# Patient Record
Sex: FEMALE | Race: ASIAN | NOT HISPANIC OR LATINO | Employment: OTHER | URBAN - METROPOLITAN AREA
[De-identification: names, ages, dates, MRNs, and addresses within clinical notes are randomized per-mention and may not be internally consistent; named-entity substitution may affect disease eponyms.]

---

## 2020-02-07 ENCOUNTER — APPOINTMENT (OUTPATIENT)
Dept: CT IMAGING | Facility: CLINIC | Age: 80
DRG: 153 | End: 2020-02-07
Attending: EMERGENCY MEDICINE
Payer: COMMERCIAL

## 2020-02-07 ENCOUNTER — HOSPITAL ENCOUNTER (INPATIENT)
Facility: CLINIC | Age: 80
LOS: 1 days | Discharge: HOME | DRG: 153 | End: 2020-02-08
Attending: EMERGENCY MEDICINE | Admitting: INTERNAL MEDICINE
Payer: COMMERCIAL

## 2020-02-07 DIAGNOSIS — J36 TONSILLAR ABSCESS: ICD-10-CM

## 2020-02-07 PROBLEM — L02.91 ABSCESS: Status: ACTIVE | Noted: 2020-02-07

## 2020-02-07 LAB
ANION GAP SERPL CALCULATED.3IONS-SCNC: 6 MMOL/L (ref 3–14)
BUN SERPL-MCNC: 26 MG/DL (ref 7–30)
CALCIUM SERPL-MCNC: 9.3 MG/DL (ref 8.5–10.1)
CHLORIDE SERPL-SCNC: 104 MMOL/L (ref 94–109)
CO2 SERPL-SCNC: 28 MMOL/L (ref 20–32)
CREAT SERPL-MCNC: 0.99 MG/DL (ref 0.52–1.04)
DEPRECATED S PYO AG THROAT QL EIA: NORMAL
ERYTHROCYTE [DISTWIDTH] IN BLOOD BY AUTOMATED COUNT: 12.6 % (ref 10–15)
GFR SERPL CREATININE-BSD FRML MDRD: 53 ML/MIN/{1.73_M2}
GLUCOSE SERPL-MCNC: 106 MG/DL (ref 70–99)
HCT VFR BLD AUTO: 44.8 % (ref 35–47)
HGB BLD-MCNC: 14.4 G/DL (ref 11.7–15.7)
MCH RBC QN AUTO: 30.4 PG (ref 26.5–33)
MCHC RBC AUTO-ENTMCNC: 32.1 G/DL (ref 31.5–36.5)
MCV RBC AUTO: 95 FL (ref 78–100)
PLATELET # BLD AUTO: 246 10E9/L (ref 150–450)
POTASSIUM SERPL-SCNC: 4.4 MMOL/L (ref 3.4–5.3)
RBC # BLD AUTO: 4.73 10E12/L (ref 3.8–5.2)
SODIUM SERPL-SCNC: 138 MMOL/L (ref 133–144)
SPECIMEN SOURCE: NORMAL
WBC # BLD AUTO: 9.8 10E9/L (ref 4–11)

## 2020-02-07 PROCEDURE — 80048 BASIC METABOLIC PNL TOTAL CA: CPT | Performed by: EMERGENCY MEDICINE

## 2020-02-07 PROCEDURE — 70491 CT SOFT TISSUE NECK W/DYE: CPT

## 2020-02-07 PROCEDURE — 87081 CULTURE SCREEN ONLY: CPT | Performed by: EMERGENCY MEDICINE

## 2020-02-07 PROCEDURE — 99222 1ST HOSP IP/OBS MODERATE 55: CPT | Mod: AI | Performed by: INTERNAL MEDICINE

## 2020-02-07 PROCEDURE — 25000125 ZZHC RX 250: Performed by: EMERGENCY MEDICINE

## 2020-02-07 PROCEDURE — 87880 STREP A ASSAY W/OPTIC: CPT | Performed by: EMERGENCY MEDICINE

## 2020-02-07 PROCEDURE — 25000128 H RX IP 250 OP 636: Performed by: EMERGENCY MEDICINE

## 2020-02-07 PROCEDURE — 96374 THER/PROPH/DIAG INJ IV PUSH: CPT | Mod: 59

## 2020-02-07 PROCEDURE — 12000000 ZZH R&B MED SURG/OB

## 2020-02-07 PROCEDURE — 85027 COMPLETE CBC AUTOMATED: CPT | Performed by: EMERGENCY MEDICINE

## 2020-02-07 PROCEDURE — 25800030 ZZH RX IP 258 OP 636: Performed by: EMERGENCY MEDICINE

## 2020-02-07 PROCEDURE — 96375 TX/PRO/DX INJ NEW DRUG ADDON: CPT

## 2020-02-07 PROCEDURE — 99285 EMERGENCY DEPT VISIT HI MDM: CPT | Mod: 25

## 2020-02-07 RX ORDER — LIDOCAINE 40 MG/G
CREAM TOPICAL
Status: DISCONTINUED | OUTPATIENT
Start: 2020-02-07 | End: 2020-02-08 | Stop reason: HOSPADM

## 2020-02-07 RX ORDER — AMOXICILLIN 250 MG
2 CAPSULE ORAL 2 TIMES DAILY PRN
Status: DISCONTINUED | OUTPATIENT
Start: 2020-02-07 | End: 2020-02-08 | Stop reason: HOSPADM

## 2020-02-07 RX ORDER — ACETAMINOPHEN 650 MG/1
650 SUPPOSITORY RECTAL EVERY 4 HOURS PRN
Status: DISCONTINUED | OUTPATIENT
Start: 2020-02-07 | End: 2020-02-08 | Stop reason: HOSPADM

## 2020-02-07 RX ORDER — ONDANSETRON 4 MG/1
4 TABLET, ORALLY DISINTEGRATING ORAL EVERY 6 HOURS PRN
Status: DISCONTINUED | OUTPATIENT
Start: 2020-02-07 | End: 2020-02-08 | Stop reason: HOSPADM

## 2020-02-07 RX ORDER — HYDROMORPHONE HYDROCHLORIDE 1 MG/ML
0.2 INJECTION, SOLUTION INTRAMUSCULAR; INTRAVENOUS; SUBCUTANEOUS
Status: DISCONTINUED | OUTPATIENT
Start: 2020-02-07 | End: 2020-02-08 | Stop reason: HOSPADM

## 2020-02-07 RX ORDER — AMPICILLIN AND SULBACTAM 2; 1 G/1; G/1
3 INJECTION, POWDER, FOR SOLUTION INTRAMUSCULAR; INTRAVENOUS ONCE
Status: COMPLETED | OUTPATIENT
Start: 2020-02-07 | End: 2020-02-07

## 2020-02-07 RX ORDER — DEXAMETHASONE SODIUM PHOSPHATE 10 MG/ML
10 INJECTION, SOLUTION INTRAMUSCULAR; INTRAVENOUS ONCE
Status: COMPLETED | OUTPATIENT
Start: 2020-02-07 | End: 2020-02-07

## 2020-02-07 RX ORDER — KETOROLAC TROMETHAMINE 15 MG/ML
15 INJECTION, SOLUTION INTRAMUSCULAR; INTRAVENOUS EVERY 6 HOURS PRN
Status: DISCONTINUED | OUTPATIENT
Start: 2020-02-08 | End: 2020-02-08 | Stop reason: HOSPADM

## 2020-02-07 RX ORDER — NALOXONE HYDROCHLORIDE 0.4 MG/ML
.1-.4 INJECTION, SOLUTION INTRAMUSCULAR; INTRAVENOUS; SUBCUTANEOUS
Status: DISCONTINUED | OUTPATIENT
Start: 2020-02-07 | End: 2020-02-08 | Stop reason: HOSPADM

## 2020-02-07 RX ORDER — IOPAMIDOL 755 MG/ML
500 INJECTION, SOLUTION INTRAVASCULAR ONCE
Status: COMPLETED | OUTPATIENT
Start: 2020-02-07 | End: 2020-02-07

## 2020-02-07 RX ORDER — POLYETHYLENE GLYCOL 3350 17 G/17G
17 POWDER, FOR SOLUTION ORAL DAILY PRN
Status: DISCONTINUED | OUTPATIENT
Start: 2020-02-07 | End: 2020-02-08 | Stop reason: HOSPADM

## 2020-02-07 RX ORDER — DEXAMETHASONE SODIUM PHOSPHATE 4 MG/ML
4 INJECTION, SOLUTION INTRA-ARTICULAR; INTRALESIONAL; INTRAMUSCULAR; INTRAVENOUS; SOFT TISSUE EVERY 6 HOURS
Status: DISCONTINUED | OUTPATIENT
Start: 2020-02-08 | End: 2020-02-08 | Stop reason: HOSPADM

## 2020-02-07 RX ORDER — BISACODYL 10 MG
10 SUPPOSITORY, RECTAL RECTAL DAILY PRN
Status: DISCONTINUED | OUTPATIENT
Start: 2020-02-07 | End: 2020-02-08 | Stop reason: HOSPADM

## 2020-02-07 RX ORDER — KETOROLAC TROMETHAMINE 15 MG/ML
15 INJECTION, SOLUTION INTRAMUSCULAR; INTRAVENOUS ONCE
Status: COMPLETED | OUTPATIENT
Start: 2020-02-07 | End: 2020-02-07

## 2020-02-07 RX ORDER — AMOXICILLIN 250 MG
1 CAPSULE ORAL 2 TIMES DAILY PRN
Status: DISCONTINUED | OUTPATIENT
Start: 2020-02-07 | End: 2020-02-08 | Stop reason: HOSPADM

## 2020-02-07 RX ORDER — AMPICILLIN AND SULBACTAM 2; 1 G/1; G/1
3 INJECTION, POWDER, FOR SOLUTION INTRAMUSCULAR; INTRAVENOUS EVERY 6 HOURS
Status: DISCONTINUED | OUTPATIENT
Start: 2020-02-08 | End: 2020-02-08 | Stop reason: HOSPADM

## 2020-02-07 RX ORDER — ONDANSETRON 2 MG/ML
4 INJECTION INTRAMUSCULAR; INTRAVENOUS EVERY 6 HOURS PRN
Status: DISCONTINUED | OUTPATIENT
Start: 2020-02-07 | End: 2020-02-08 | Stop reason: HOSPADM

## 2020-02-07 RX ORDER — HYDRALAZINE HYDROCHLORIDE 20 MG/ML
10 INJECTION INTRAMUSCULAR; INTRAVENOUS EVERY 4 HOURS PRN
Status: DISCONTINUED | OUTPATIENT
Start: 2020-02-07 | End: 2020-02-08 | Stop reason: HOSPADM

## 2020-02-07 RX ORDER — SODIUM CHLORIDE 9 MG/ML
INJECTION, SOLUTION INTRAVENOUS CONTINUOUS
Status: DISCONTINUED | OUTPATIENT
Start: 2020-02-07 | End: 2020-02-08

## 2020-02-07 RX ADMIN — AMPICILLIN SODIUM AND SULBACTAM SODIUM 3 G: 2; 1 INJECTION, POWDER, FOR SOLUTION INTRAMUSCULAR; INTRAVENOUS at 22:30

## 2020-02-07 RX ADMIN — KETOROLAC TROMETHAMINE 15 MG: 15 INJECTION, SOLUTION INTRAMUSCULAR; INTRAVENOUS at 20:25

## 2020-02-07 RX ADMIN — DEXAMETHASONE SODIUM PHOSPHATE 10 MG: 10 INJECTION, SOLUTION INTRAMUSCULAR; INTRAVENOUS at 22:29

## 2020-02-07 RX ADMIN — SODIUM CHLORIDE 1000 ML: 9 INJECTION, SOLUTION INTRAVENOUS at 20:25

## 2020-02-07 RX ADMIN — IOPAMIDOL 80 ML: 755 INJECTION, SOLUTION INTRAVENOUS at 20:41

## 2020-02-07 RX ADMIN — SODIUM CHLORIDE 65 ML: 9 INJECTION, SOLUTION INTRAVENOUS at 20:42

## 2020-02-07 ASSESSMENT — MIFFLIN-ST. JEOR
SCORE: 837.83
SCORE: 832.88

## 2020-02-08 VITALS
HEIGHT: 57 IN | RESPIRATION RATE: 20 BRPM | BODY MASS INDEX: 23.97 KG/M2 | HEART RATE: 65 BPM | OXYGEN SATURATION: 98 % | WEIGHT: 111.1 LBS | DIASTOLIC BLOOD PRESSURE: 57 MMHG | SYSTOLIC BLOOD PRESSURE: 130 MMHG | TEMPERATURE: 97.1 F

## 2020-02-08 LAB
ANION GAP SERPL CALCULATED.3IONS-SCNC: 7 MMOL/L (ref 3–14)
BUN SERPL-MCNC: 27 MG/DL (ref 7–30)
CALCIUM SERPL-MCNC: 8.2 MG/DL (ref 8.5–10.1)
CHLORIDE SERPL-SCNC: 109 MMOL/L (ref 94–109)
CO2 SERPL-SCNC: 24 MMOL/L (ref 20–32)
CREAT SERPL-MCNC: 0.93 MG/DL (ref 0.52–1.04)
ERYTHROCYTE [DISTWIDTH] IN BLOOD BY AUTOMATED COUNT: 12.6 % (ref 10–15)
GFR SERPL CREATININE-BSD FRML MDRD: 57 ML/MIN/{1.73_M2}
GLUCOSE SERPL-MCNC: 158 MG/DL (ref 70–99)
HCT VFR BLD AUTO: 38.1 % (ref 35–47)
HGB BLD-MCNC: 12.2 G/DL (ref 11.7–15.7)
MCH RBC QN AUTO: 29.9 PG (ref 26.5–33)
MCHC RBC AUTO-ENTMCNC: 32 G/DL (ref 31.5–36.5)
MCV RBC AUTO: 93 FL (ref 78–100)
PLATELET # BLD AUTO: 212 10E9/L (ref 150–450)
POTASSIUM SERPL-SCNC: 3.9 MMOL/L (ref 3.4–5.3)
RBC # BLD AUTO: 4.08 10E12/L (ref 3.8–5.2)
SODIUM SERPL-SCNC: 140 MMOL/L (ref 133–144)
WBC # BLD AUTO: 5.5 10E9/L (ref 4–11)

## 2020-02-08 PROCEDURE — 80048 BASIC METABOLIC PNL TOTAL CA: CPT | Performed by: INTERNAL MEDICINE

## 2020-02-08 PROCEDURE — 25000128 H RX IP 250 OP 636: Performed by: INTERNAL MEDICINE

## 2020-02-08 PROCEDURE — 85027 COMPLETE CBC AUTOMATED: CPT | Performed by: INTERNAL MEDICINE

## 2020-02-08 PROCEDURE — 99238 HOSP IP/OBS DSCHRG MGMT 30/<: CPT | Performed by: INTERNAL MEDICINE

## 2020-02-08 PROCEDURE — 36415 COLL VENOUS BLD VENIPUNCTURE: CPT | Performed by: INTERNAL MEDICINE

## 2020-02-08 PROCEDURE — 25800030 ZZH RX IP 258 OP 636: Performed by: INTERNAL MEDICINE

## 2020-02-08 PROCEDURE — 25000125 ZZHC RX 250

## 2020-02-08 RX ORDER — AMOXICILLIN AND CLAVULANATE POTASSIUM 400; 57 MG/5ML; MG/5ML
875 POWDER, FOR SUSPENSION ORAL 2 TIMES DAILY
Qty: 283.4 ML | Refills: 0 | Status: SHIPPED | OUTPATIENT
Start: 2020-02-08 | End: 2020-02-21

## 2020-02-08 RX ORDER — PREDNISOLONE 15 MG/5 ML
45 SOLUTION, ORAL ORAL DAILY
Qty: 75 ML | Refills: 0 | Status: SHIPPED | OUTPATIENT
Start: 2020-02-08 | End: 2020-02-13

## 2020-02-08 RX ORDER — NAPROXEN 500 MG/1
500 TABLET ORAL PRN
COMMUNITY

## 2020-02-08 RX ORDER — LIDOCAINE HYDROCHLORIDE 20 MG/ML
JELLY TOPICAL ONCE
Status: COMPLETED | OUTPATIENT
Start: 2020-02-08 | End: 2020-02-08

## 2020-02-08 RX ORDER — HYDROCHLOROTHIAZIDE 12.5 MG/1
12.5 CAPSULE ORAL DAILY
COMMUNITY

## 2020-02-08 RX ORDER — LIDOCAINE HYDROCHLORIDE 20 MG/ML
JELLY TOPICAL
Status: COMPLETED
Start: 2020-02-08 | End: 2020-02-08

## 2020-02-08 RX ADMIN — SODIUM CHLORIDE: 9 INJECTION, SOLUTION INTRAVENOUS at 02:17

## 2020-02-08 RX ADMIN — DEXAMETHASONE SODIUM PHOSPHATE 4 MG: 4 INJECTION, SOLUTION INTRAMUSCULAR; INTRAVENOUS at 04:19

## 2020-02-08 RX ADMIN — LIDOCAINE HYDROCHLORIDE: 20 JELLY TOPICAL at 10:15

## 2020-02-08 RX ADMIN — AMPICILLIN SODIUM AND SULBACTAM SODIUM 3 G: 2; 1 INJECTION, POWDER, FOR SOLUTION INTRAMUSCULAR; INTRAVENOUS at 04:22

## 2020-02-08 RX ADMIN — DEXAMETHASONE SODIUM PHOSPHATE 4 MG: 4 INJECTION, SOLUTION INTRAMUSCULAR; INTRAVENOUS at 09:48

## 2020-02-08 RX ADMIN — AMPICILLIN SODIUM AND SULBACTAM SODIUM 3 G: 2; 1 INJECTION, POWDER, FOR SOLUTION INTRAMUSCULAR; INTRAVENOUS at 09:51

## 2020-02-08 ASSESSMENT — ACTIVITIES OF DAILY LIVING (ADL)
ADLS_ACUITY_SCORE: 10
ADLS_ACUITY_SCORE: 10
ADLS_ACUITY_SCORE: 12
ADLS_ACUITY_SCORE: 10

## 2020-02-08 NOTE — PLAN OF CARE
Pt to D/C to home with .  Pt provided with d/c instructions, including new medications, when medications were last given, and when to take them again.  Pt also informed to f/u with ENT next week.  Pt verbalized understanding of all d/c and f/u instructions.  All questions were answered at this time.  Copy of paperwork sent with pt.  Medication sent with pt.   to provide transport.  All personal belongings sent with pt.     Pt A/Ox4, moving steady and up ad laila after IV removed. Pain has improved substantially since yesterday.    Dean Diana, RN on 2/8/2020 at 1:10 PM

## 2020-02-08 NOTE — ED NOTES
"[facility]  ED Nurse Handoff Report    Mariluz Gayle is a 80 year old female   ED Chief complaint: Pharyngitis  . ED Diagnosis:   Final diagnoses:   Tonsillar abscess     Allergies:   Allergies   Allergen Reactions     Amlodipine      Other reaction(s): Gastrointestinal  Abdominal pain     Lisinopril Other (See Comments)     Altered taste per patient     Tramadol Nausea and Vomiting     Other reaction(s): Intolerance-Can't Take  Nausea, side effect NOT a true allergy         Code Status: Full Code  Activity level - Baseline/Home:  Independent. Activity Level - Current:   Stand by Assist. Lift room needed: No. Bariatric: No   Needed: No   Isolation: No. Infection: Not Applicable.     Vital Signs:   Vitals:    02/07/20 1850   BP: (!) 158/93   Resp: 18   Temp: 97.9  F (36.6  C)   TempSrc: Oral   SpO2: 94%   Weight: 49.9 kg (110 lb 0.2 oz)   Height: 1.448 m (4' 9\")       Cardiac Rhythm:  ,      Pain level: 0-10 Pain Scale: 10  Patient confused: No. Patient Falls Risk: Yes.   Elimination Status: Has not yet needed to void   Patient Report - Initial Complaint: Sore throat.   Focused Assessment:   Mariluz Gayle is a 80 year old female who presents with pharyngitis.  Patient reports her symptoms first began 8 days prior with a sore throat.  This occurred approximately 2 to 3 hours after eating Chinese food.  She was seen on 1/31/2019, diagnosed with a viral pharyngitis.  Rapid strep test returned negative.  She was seen the following day, and started on a 3-day course of prednisone.  Her symptoms have persisted including a visit to her PCP yesterday, with a diagnosis of pharyngitis as well as acute otitis media for which he was started on Augmentin.  She presents to the ER today with worsening pain.  She notes pain worsened to the right side of her neck.  She is unsure if she swallowed a foreign body.  She is denies associated fevers, or chills.  She notes significant pain with swallowing liquids or " solids.  She has been without vomiting.  Denies any known sick contacts.   Tests Performed:   Soft tissue neck CT w contrast   Final Result   IMPRESSION:   1. Low-attenuation collection is seen within the right palatine tonsil with surrounding rim enhancement and compatible with an abscess. There is an incomplete enhancing margin along its superomedial aspect suggesting that this may be partially draining.      2. There is extensive inflammatory fat stranding and swelling within the right neck and extending along the right carotid sheath. No extratonsillar collections to suggest extratonsillar abscess. No extension of inflammatory changes into the mediastinum.      Findings discussed with Dr. Duval at 21:03 hours.      Abnormal Results:   Labs Ordered and Resulted from Time of ED Arrival Up to the Time of Departure from the ED   BASIC METABOLIC PANEL - Abnormal; Notable for the following components:       Result Value    Glucose 106 (*)     GFR Estimate 53 (*)     All other components within normal limits   CBC WITH PLATELETS   RAPID STREP SCREEN   BETA STREP GROUP A CULTURE      Treatments provided: PIV, Abx, corticosteroids  Family Comments:  Regino was at bedside, updated on pt's plan of care.  OBS brochure/video discussed/provided to patient:  Yes  ED Medications:   Medications   dexamethasone PF (DECADRON) injection 10 mg (has no administration in time range)   ampicillin-sulbactam (UNASYN) 3 g vial to attach to  mL bag (has no administration in time range)   0.9% sodium chloride BOLUS (1,000 mLs Intravenous New Bag 2/7/20 2025)   ketorolac (TORADOL) injection 15 mg (15 mg Intravenous Given 2/7/20 2025)   Saline CT scan flush (65 mLs Intravenous Given 2/7/20 2042)   iopamidol (ISOVUE-370) solution 500 mL (80 mLs Intravenous Given 2/7/20 2041)     Drips infusing:  No  For the majority of the shift, the patient's behavior Green.     Severe Sepsis OR Septic Shock Diagnosis Present: No      ED Nurse  Name/Phone Number: Melissa Carolina RN,   10:12 PM    RECEIVING UNIT ED HANDOFF REVIEW    Above ED Nurse Handoff Report was reviewed: Yes  Reviewed by: Katrin Carrion RN on February 7, 2020 at 10:54 PM

## 2020-02-08 NOTE — ED PROVIDER NOTES
"  History     Chief Complaint:  Pharyngitis     HPI:  Mariluz Gayle is a 80 year old female who presents with pharyngitis.  Patient reports her symptoms first began 8 days prior with a sore throat.  This occurred approximately 2 to 3 hours after eating Chinese food.  She was seen on 1/31/2019, diagnosed with a viral pharyngitis.  Rapid strep test returned negative.  She was seen the following day, and started on a 3-day course of prednisone.  Her symptoms have persisted including a visit to her PCP yesterday, with a diagnosis of pharyngitis as well as acute otitis media for which he was started on Augmentin.  She presents to the ER today with worsening pain.  She notes pain worsened to the right side of her neck.  She is unsure if she swallowed a foreign body.  She is denies associated fevers, or chills.  She notes significant pain with swallowing liquids or solids.  She has been without vomiting.  Denies any known sick contacts.    Allergies:  Amlodipine  Lisinopril  Tramadol     Medications:    Calcium Carbonate  Percocet  Senokot    Past Medical History:    Osteoporosis  Fibrocystic breast disease  Radiculopathy of lumbar region    Past Surgical History:    Rotator cuff repair    Family History:    family history is not on file.    Social History:  Non smoker  Presents with .    Review of Systems:  10 point ROS is negative aside from that mentioned in the HPI      Physical Exam     Patient Vitals for the past 24 hrs:   BP Temp Temp src Heart Rate Resp SpO2 Height Weight   02/07/20 1850 (!) 158/93 97.9  F (36.6  C) Oral 96 18 94 % 1.448 m (4' 9\") 49.9 kg (110 lb 0.2 oz)      General:   Well-nourished   Speaking in full sentences  Eyes:   Conjunctiva without injection or scleral icterus   PERRL   EOM full w/out entrapment or proptosis  ENT:   Moist mucous membranes   No dental tenderness   No fluctuance to palpation along buccal or lingual gingiva   Posterior oropharynx clear with mild erythema, no " exudate   No tonsillar hypertrophy, exudate, asymmetry, nor uvular deviation   No oral lesions   Bilateral TM translucent and gray without air/fluid level or overlying erythema, bony landmarks visualized.   Nares patent   Pinnae normal   No midface swelling, erythema, or asymmetry  Neck:   Full ROM   No stiffness appreciated   No stridor   No soft tissue swelling  Resp:   Lungs CTAB   No crackles, wheezing or audible rubs   Good air movement  CV:    Normal rate, regular rhythm   S1 and S2 present   No murmur, gallop or rub  GI:   BS present   Abdomen soft without distention   Non-tender to light and deep palpation   No guarding or rebound tenderness  Skin:   Warm, dry, well perfused   No rashes or open wounds on exposed skin  MSK:   Moves all extremities   No focal deformities or swelling  Neuro:   Alert   Answers questions appropriately   Moves all extremities equally   Gait stable  Psych:   Normal affect, normal mood      Emergency Department Course   Imaging:  Radiology findings were communicated with the patient who voiced understanding of the findings.  Soft tissue neck CT w contrast   Final Result   IMPRESSION:   1. Low-attenuation collection is seen within the right palatine tonsil with surrounding rim enhancement and compatible with an abscess. There is an incomplete enhancing margin along its superomedial aspect suggesting that this may be partially draining.      2. There is extensive inflammatory fat stranding and swelling within the right neck and extending along the right carotid sheath. No extratonsillar collections to suggest extratonsillar abscess. No extension of inflammatory changes into the mediastinum.      Findings discussed with Dr. Duval at 21:03 hours.           Laboratory:  Laboratory findings were communicated with the patient who voiced understanding of the findings.  Labs Ordered and Resulted from Time of ED Arrival Up to the Time of Departure from the ED   BASIC METABOLIC PANEL -  Abnormal; Notable for the following components:       Result Value    Glucose 106 (*)     GFR Estimate 53 (*)     All other components within normal limits   CBC WITH PLATELETS   RAPID STREP SCREEN   BETA STREP GROUP A CULTURE      Interventions:  Medications   ampicillin-sulbactam (UNASYN) 3 g vial to attach to  mL bag (3 g Intravenous New Bag 2/7/20 2230)   0.9% sodium chloride BOLUS (1,000 mLs Intravenous New Bag 2/7/20 2025)   ketorolac (TORADOL) injection 15 mg (15 mg Intravenous Given 2/7/20 2025)   Saline CT scan flush (65 mLs Intravenous Given 2/7/20 2042)   iopamidol (ISOVUE-370) solution 500 mL (80 mLs Intravenous Given 2/7/20 2041)   dexamethasone PF (DECADRON) injection 10 mg (10 mg Intravenous Given 2/7/20 2229)        Emergency Department Course:  Nursing notes and vitals reviewed.  8:12 PM: I performed an exam of the patient as documented above.      IV was inserted and blood was drawn for laboratory testing, results above.    The patient was sent for CT while in the emergency department, results above.      Patient re-evaluated.    Case discussed with Dr. Martin from ENT.     I discussed the treatment plan with the patient and .  They are in agreement with hospitalization at this time.  Case was discussed with Dr. Perez, who has graciously accepted the patient for admission to medical bed.  Questions were answered prior to transfer of care.      Impression & Plan      Medical Decision Making:  Mariluz Gayle is a 80 year old female who presents to the ER for evaluation of pharyngitis.  Vital signs on presentation reveal elevated BP, though are otherwise unremarkable.  Given duration of symptoms, further advanced imaging was performed including CT soft tissue of the neck.  This demonstrates findings suspicious for a palatine tonsil fluid collection measuring approximately 2.7 x 1.5 x 1.0 cm in size with associated inflammatory changes and fat stranding extending in the right neck and  extending along the right carotid sheath, without evidence of extra tonsillar abscess.  Patient was informed of the incidentally noted atherosclerotic calcification and suggested follow-up with primary care provider regarding this.  Results of the imaging were discussed with Dr. Martin of ENT, who reviewed the imaging and agreed this was not something requiring emergent drainage at this time.  Patient will be started on IV antibiotics and IV steroids and will plan for admission for close monitoring.  ENT will follow along.  Results and impression were discussed with the patient and her .  Patient will be admitted to the hospitalist service under the care of Dr. Perez for further treatment and care.    Diagnosis:    ICD-10-CM    1. Tonsillar abscess J36       2/7/2020   Memo Duval MD Roach, Brian Donald, MD  02/07/20 2242

## 2020-02-08 NOTE — PHARMACY-ADMISSION MEDICATION HISTORY
Admission medication history interview status for this patient is complete. See [facility] admission navigator for allergy information, prior to admission medications and immunization status.     Medication history interview source(s):Patient  Medication history resources (including written lists, pill bottles, clinic record):None  Primary pharmacy:     Changes made to PTA medication list:  Added: all meds  Deleted: -  Changed: -    Actions taken by pharmacist (provider contacted, etc):None     Additional medication history information:None    Medication reconciliation/reorder completed by provider prior to medication history?  No     Do you take OTC medications (eg tylenol, ibuprofen, fish oil, eye/ear drops, etc)? Yes     For patients on insulin therapy: No    Prior to Admission medications    Medication Sig Last Dose Taking? Auth Provider   cholecalciferol (VITAMIN D3) 5000 units (125 mcg) capsule Take 5,000 Units by mouth daily 2/7/2020 at Unknown time Yes Unknown, Entered By History   hydrochlorothiazide (MICROZIDE) 12.5 MG capsule Take 12.5 mg by mouth daily 2/7/2020 at Unknown time Yes Unknown, Entered By History   naproxen (NAPROXEN) 500 MG EC tablet Take 500 mg by mouth as needed Rarely uses Past Month at Unknown time Yes Unknown, Entered By History

## 2020-02-08 NOTE — CONSULTS
[facility] Otolaryngology Consultation    Mariluz Gayle MRN# [id number]   Age: 80 year old YOB: 1940     Date of Admission:  2/7/2020    Reason for consult: Tonsillitis       Requesting physician: Ana       Level of consult: Consult, follow and place orders           Assessment and Plan:   Assessment:   Tonsillitis with tonsil abscess - She has tonsillitis with extension of infection toward the base of tongue and near the hyoid bone.  The area of concern for fluid collection is quite small less than 1 cm.  The right tonsil although has some lucency on the CT scan does not clinically appear to have any drainable abscess, of if drainable it is very small and drainage is not necessary.       Plan:   I do not recommend surgery.  I believe she will get better with antibiotic treatment and some oral prednisone will also help with pain and swelling and speed her recovery. Recommend Augmentin PO liquid for home for 14 days (she had a hard time with the large pills previously).  Recommend prednisone tabs 50mg daily for 5 days, no taper.   She may stay in the hospital until this afternoon.  She may start a diet.  If she is able to take p.o. and has been good pain control I think she can go home this afternoon.  I recommend a follow-up visit in ENT clinic Thursday or Friday this week.  The patient was given my name and phone number and instructed to call the clinic Monday to make an appointment.             Chief Complaint:   Tonsillitis     History is obtained from the patient         History of Present Illness:   Mariluz Gayle is a 80 year old female who presents with pharyngitis.  Patient reports her symptoms first began 8 days prior with a sore throat.  This occurred approximately 2 to 3 hours after eating Chinese food.  She was seen on 1/31/2019, diagnosed with a viral pharyngitis.  Rapid strep test returned negative.  She was seen the following day, and started on a 3-day course of prednisone.  Her  symptoms have persisted including a visit to her PCP yesterday, with a diagnosis of pharyngitis as well as acute otitis media for which he was started on Augmentin.  She presents to the ER today with worsening pain.  She notes pain worsened to the right side of her neck.  She is unsure if she swallowed a foreign body.  She is denies associated fevers, or chills.  She notes significant pain with swallowing liquids or solids.  She has been without vomiting.  Denies any known sick contacts.          Past Medical History:   I have reviewed this patient's past medical history          Past Surgical History:   I have reviewed this patient's past surgical history          Social History:   I have reviewed this patient's social history          Family History:   This patient has no significant family history          Immunizations:   UTD          Allergies:     Allergies   Allergen Reactions     Amlodipine      Other reaction(s): Gastrointestinal  Abdominal pain     Lisinopril Other (See Comments)     Altered taste per patient     Tramadol Nausea and Vomiting     Other reaction(s): Intolerance-Can't Take  Nausea, side effect NOT a true allergy               Medications:     Current Facility-Administered Medications   Medication     lidocaine (XYLOCAINE) 2 % external gel     acetaminophen (TYLENOL) Suppository 650 mg     ampicillin-sulbactam (UNASYN) 3 g vial to attach to  mL bag     bisacodyl (DULCOLAX) Suppository 10 mg     dexamethasone (DECADRON) injection 4 mg     hydrALAZINE (APRESOLINE) injection 10 mg     HYDROmorphone (PF) (DILAUDID) injection 0.2 mg     ketorolac (TORADOL) injection 15 mg     lidocaine (LMX4) cream     lidocaine (XYLOCAINE) 2 % external gel     lidocaine 1 % 0.1-1 mL     melatonin tablet 1 mg     naloxone (NARCAN) injection 0.1-0.4 mg     ondansetron (ZOFRAN-ODT) ODT tab 4 mg    Or     ondansetron (ZOFRAN) injection 4 mg     polyethylene glycol (MIRALAX/GLYCOLAX) Packet 17 g      "senna-docusate (SENOKOT-S/PERICOLACE) 8.6-50 MG per tablet 1 tablet    Or     senna-docusate (SENOKOT-S/PERICOLACE) 8.6-50 MG per tablet 2 tablet     sodium chloride (PF) 0.9% PF flush 3 mL     sodium chloride (PF) 0.9% PF flush 3 mL     sodium chloride 0.9% infusion             Review of Systems:   CONSTITUTIONAL: NEGATIVE for fever, chills, change in weight  RESP: NEGATIVE for significant cough or SOB  CV: NEGATIVE for chest pain, palpitations or peripheral edema          Physical Exam:   /57 (BP Location: Right arm)   Pulse 65   Temp 97.1  F (36.2  C) (Oral)   Resp 20   Ht 1.448 m (4' 9\")   Wt 50.4 kg (111 lb 1.6 oz)   SpO2 98%   BMI 24.04 kg/m    PHYSICAL EXAMINATION    /57 (BP Location: Right arm)   Pulse 65   Temp 97.1  F (36.2  C) (Oral)   Resp 20   Ht 1.448 m (4' 9\")   Wt 50.4 kg (111 lb 1.6 oz)   SpO2 98%   BMI 24.04 kg/m    General appearance: Well developed, well nourished and groomed. No apparent acute or chronic distress.   Ability to communicate: normal. No hoarseness or stridor  HEAD, FACE, SALIVARY GLANDS AND TMJ:   Inspection of Head and Face: Normal contour and symmetry. No masses, lesions, or significant scars observed.   Palpation/Percussion of the Face: No tenderness, deformity or instability.   Palpation of Parotid and Submaxillary glands: Normal.   Facial Mobility: Normal.   EYES: Ocular Motility: gaze appears conjugate in all positions; no evident nystagmus.   EAR, NOSE, MOUTH AND THROAT:   Pinnas and External Nose: Normal.   Hearing: Conversational speech rarely or never misunderstands words.   Nasal Interior:   Septum - Midline.   Turbinates and middle meatus - Normal.   Rhinorrhea - None.   Vestibular skin - Normal bilaterally.   Mucosa - Normal.   Lips, Teeth and Gums: Normal. Adequate salivary pool.  Dentition in good repair  Oral Cavity and Oropharynx: mild swelling right tonsil, minimal erythema.  No trismus.  No palatal bulging.  No tongue swelling.    NECK " AND THYROID:   Neck: normal symmetry; trachea midline; normal laryngeal crepitation; no adenopathy; no neck masses; no skin lesions; no scars.   Thyroid: normal size; no masses or tenderness.   RESPIRATORY: Chest Wall expands normally and no deformities noted. Respiratory Effort normal.   CARDIOVASCULAR:   Symmetric carotid pulses regular rate.  Peripheral Vascular System: normal pulses with no peripheral vascular swelling or varicosities, tenderness or edema. Skin warm and dry.   LYMPH NODES: Neck Nodes: normal, no adenopathy.   NEUROLOGIC:   Level of orientation: normal to time, place, person and situation.   Cranial nerves: Cranial nerves II-XII grossly intact and symmetrical.   PSYCHIATRIC:   Level of consciousness: awake and alert.   Judgment and insight: normal.   Mood and affect: normal and appropriate to the situation.     FLEXIBLE FIBEROPTIC LARYNGOSCOPY.  Nasal cavities: patent and normal.    Nasopharynx examination: Normal   Base of tongue, Pharyngeal walls, Vallecula and Pyriform Sinuses: right base of tongue at junction of the tonsil has small amount of swelling, small amount of cloudy drainage near this area which could be some debris in the right tonsil, or possibly spontaneous drainage from t todayhe tonsil  Larynx: Normal, no edema, vocal cords symmetrically mobile  Subglottis and trachea: Normal           Data:   All laboratory data reviewed    CT neck reviewed and not convincing abscess which would be drainable.  The lucency of the right tonsil is possibly debris in a crypt. The lucency near the hyoid is small.        Memo Martin MD

## 2020-02-08 NOTE — PLAN OF CARE
Patient admitted with throat pain, peritonsillar abscess. Patient states area is now a little sore but feeling much better and able to swallow. Up with SBA. Receiving Unasyn and Decadron.

## 2020-02-08 NOTE — H&P
[facility]    History and Physical - Hospitalist Service       Date of Admission:  2/7/2020    Assessment & Plan   Mariluz Gayle is a 80 year old female admitted on 2/7/2020. She presented to the hospital with several days of sore throat.  She was found to have a peritonsillar abscess.    1.  Peritonsillar abscess.  Continue IV Unasyn.  IV Decadron 4 mg every 6 hours initially.  ENT consult.  N.p.o. after midnight.  Clear liquids until midnight.  Continuous IV fluids.  Pain medications as needed.    2.  Hypertension.  Have IV hydralazine available if needed.     Diet: Clear liquid, n.p.o. after midnight  DVT Prophylaxis: Pneumatic Compression Devices  Royal Catheter: not present  Code Status: Full code    Disposition Plan   Expected discharge: 2 - 3 days, recommended to prior living arrangement    Elvis Perez, DO  [facility]    ______________________________________________________________________    Chief Complaint   Sore throat    History is obtained from the patient    History of Present Illness   Mariluz Galye is a 80 year old female with a past medical history significant for hypertension, osteoporosis, and degenerative disc disease of her back who developed a sore throat approximately 8 days ago.  She has been having difficulty eating and drinking because of sore throat.  Sore throat seems to have gotten worse since it started 8 days ago.  She has felt chills at times.  She had actually been into the clinic a couple of times for evaluation.  She actually was started on Augmentin yesterday for possible pharyngitis.  She continued to have severe throat pain today.  Not even able to drink water earlier today.  Nothing at home seem to be helping symptoms.  She presented to emergency room for further evaluation.  She is feeling quite a bit better after pain medications given in the emergency room.  Is able to drink some liquids now.  She has not had any nausea or vomiting with this.   No other acute complaints.    Review of Systems    The 10 point Review of Systems is negative other than noted in the HPI     Past Medical History    I have reviewed this patient's medical history and updated it with pertinent information if needed.     Hypertension  Osteoporosis  DJD    Past Surgical History   I have reviewed this patient's surgical history and updated it with pertinent information if needed.    Social History   I have reviewed this patient's social history and updated it with pertinent information if needed.    She does not smoke.  Does not drink alcohol.    Family History   I have reviewed this patient's family history and updated it with pertinent information if needed.   No known cardiac disease in the immediate family.    Prior to Admission Medications   None     Allergies   Allergies   Allergen Reactions     Amlodipine      Other reaction(s): Gastrointestinal  Abdominal pain     Lisinopril Other (See Comments)     Altered taste per patient     Tramadol Nausea and Vomiting     Other reaction(s): Intolerance-Can't Take  Nausea, side effect NOT a true allergy         Physical Exam   Vital Signs: Temp: 97.9  F (36.6  C) Temp src: Oral BP: (!) 158/93   Heart Rate: 96 Resp: 18 SpO2: 94 % O2 Device: None (Room air)    Weight: 110 lbs .15 oz    Gen:  NAD, A&Ox3.  Eyes:  PERRL, sclera anicteric.  OP:  MMM, no lesions.  Neck:  Supple.  CV:  Regular, no murmurs.  Lung:  CTA b/l, normal effort.  Ab:  +BS, soft.  Skin:  Warm, dry to touch.  No rash.  Ext:  No pitting edema LE b/l.    Data   Data reviewed today: I reviewed all medications, new labs and imaging results over the last 24 hours.    Recent Labs   Lab 02/07/20 1958   WBC 9.8   HGB 14.4   MCV 95         POTASSIUM 4.4   CHLORIDE 104   CO2 28   BUN 26   CR 0.99   ANIONGAP 6   PEDRO 9.3   *

## 2020-02-08 NOTE — ED TRIAGE NOTES
Pt presents for evaluation of a sore throat since last Thursday. Pt was seen by an MD yesterday, was given Augmentin, but hasn't improved. Pt also c/o post nasal drip. Pt has difficulty eating due to swallowing. Recently had an xray to check for dental abscess, which was negative.

## 2020-02-08 NOTE — PROGRESS NOTES
SPIRITUAL HEALTH SERVICES  [facility]  ON-CALL VISIT     DATA:    SH request from Pt.  Pt has a tonsil abscess.  Listed [facility] background      INTERVENTION:    Introduced SHS; reflective listening/conversation/ offered prayer       ASSESSMENT:    Pt talked about the long journey of pain and suffering with this abscess. Expressed deep appreciation for [facility] doctors who were finally able to bring some relief, and she was able to enjoy eating solid food for the first time in 8 days.  Spouse was in the room giving support, while SH was present, Doctor came in and gave Pt discharge.  Pt expressed feeling very positive and encouraged.  Declined prayer       PLAN:    No follow up needed  Johnny Green  Chaplain Resident

## 2020-02-09 LAB
BACTERIA SPEC CULT: NORMAL
Lab: NORMAL
SPECIMEN SOURCE: NORMAL

## 2020-02-09 NOTE — DISCHARGE SUMMARY
Discharge Summary  [facility]    Mariluz Gayle MRN# [id number]   YOB: 1940 Age: 80 year old     Date of Admission:  2/7/2020  Date of Discharge:  2/8/2020  2:19 PM  Admitting Physician:  Elvis Perez DO  Discharge Physician: Gustavo Devlin MD  Discharging Service: Hospitalist     Primary Provider: Linda Silva  Primary Care Physician Phone Number: [phone]         Discharge Diagnoses/Problem Oriented Hospital Course (Providers):      Mariluz Gayle was admitted on 2/7/2020 by Elvis Perez DO and I would refer you to their history and physical.  The following problems were addressed during her hospitalization:     I saw and evaluated the patient     Discharge diagnoses:  1. Acute tonsillitis with right tonsillar abscess  2. History of hypertension      Hospital course:    Patient is a pleasant 80-year-old female with past medical history significant for hypertension, osteoporosis, and degenerative disc disease who presents here with progressive sore throat, posterior oropharyngeal pain, fevers, and chills that started 8 days prior to admission.  She also reported significant odynophagia and has had trouble eating and drinking because of her sore throat.  She was actually seen in her clinic a couple times prior to this visitation and was restarted on Augmentin the day prior to admission.  Symptoms had gotten to the point where even drinking hurts and had significant difficulty swallowing.  She presented here to the ED and a CT soft tissue the neck did demonstrate a fluid collection in the right palatine tonsil consistent with an abscess.  Patient was given IV Unasyn along with IV Decadron.  ENT was consulted.  On their consultation, they felt that the abscess in of itself was too small for drainage and only recommended a short course of steroids as well as p.o. Augmentin for 14 days.  On the day of discharge, patient reports that she feels 90% better and was able to  eat a regular diet with less difficulties in terms of swallowing or pain.  She will be discharged with outpatient ENT follow-up within a week.            Pending Results:        Unresulted Labs Ordered in the Past 30 Days of this Admission     Date and Time Order Name Status Description    2/7/2020 1959 Beta strep group A culture Preliminary                Discharge Instructions and Follow-Up:      Follow-up Appointments     Follow-up and recommended labs and tests       Call to make appointment with Dr. Martin on Monday for appt date Thursday   or Friday.                  Discharge Disposition:        Discharged to home          Discharge Medications:        Discharge Medication List as of 2/8/2020 12:54 PM      START taking these medications    Details   amoxicillin-clavulanate (AUGMENTIN) 400-57 MG/5ML suspension Take 10.9 mLs (875 mg) by mouth 2 times daily for 13 days, Disp-283.4 mL, R-0, E-Prescribe      prednisoLONE (ORAPRED/PRELONE) 15 MG/5ML solution Take 15 mLs (45 mg) by mouth daily for 5 days, Disp-75 mL, R-0, E-Prescribe         CONTINUE these medications which have NOT CHANGED    Details   cholecalciferol (VITAMIN D3) 5000 units (125 mcg) capsule Take 5,000 Units by mouth daily, Historical      hydrochlorothiazide (MICROZIDE) 12.5 MG capsule Take 12.5 mg by mouth daily, Historical      naproxen (NAPROXEN) 500 MG EC tablet Take 500 mg by mouth as needed Rarely uses, Historical                  Allergies:         Allergies   Allergen Reactions     Amlodipine      Other reaction(s): Gastrointestinal  Abdominal pain     Lisinopril Other (See Comments)     Altered taste per patient     Tramadol Nausea and Vomiting     Other reaction(s): Intolerance-Can't Take  Nausea, side effect NOT a true allergy              Consultations This Hospital Stay:        Consultation during this admission received from otolaryngology           Discharge Orders for Skilled Facility (from Discharge Orders):        After Care  Instructions     Activity      Your activity upon discharge: activity as tolerated         Diet      Regular diet                    Rehab orders for Skilled Facility (from Discharge Orders):                Image Results From This Hospital Stay (For Non-EPIC Providers):        Results for orders placed or performed during the hospital encounter of 02/07/20   Soft tissue neck CT w contrast    Narrative    [facility]  CT SOFT TISSUE NECK W CONTRAST  2/7/2020 8:39 PM     INDICATION: Sore throat since last Thursday. Started on Augmentin yesterday without improvement. Difficulty swallowing.  TECHNIQUE: Contiguous serial axial images were obtained through the neck after the administration of IV contrast. Multiplanar reformats were generated. Dose reduction techniques were used.  CONTRAST: 80 mL of Isovue 370 intravenous contrast.  COMPARISON: None.    FINDINGS: There is a vertically oriented band of low-attenuation change within the central aspect of the right palatine tonsil measuring 2.7 cm in craniocaudal dimension and measuring up to 1.5 cm in width and approximately 1 cm in AP dimension. There is   rim enhancement within the surrounding tonsillar tissue and although this is favored to reflect an abscess, superiorly the area of low-attenuation is not bounded by enhancing tissue suggesting that at least superiorly this may reflect material within a   tonsillar crypt.    There is diffuse stranding of the soft tissues deep to the right palatine tonsil and surrounding the right carotid sheath compatible with infectious/inflammatory change. No extratonsillar collections are seen. Right-sided carotid and jugular veins remain   widely patent.    Inflammatory changes on the right extend caudally to the level of the glottis. Although there is prevertebral involvement superiorly, no intrathoracic extension of inflammatory changes seen.    Oral cavity, mucosal space, and airway are otherwise unremarkable. Small  cervical chain lymph nodes are seen that do not meet size criteria for pathologic enlargement. No clearly suppurative or necrotic lymph nodes.    Heavy atherosclerotic calcification is seen within the visualized aortic arch and to a lesser extent proximal great vessels. Visualized lung apices reveal moderate centrilobular emphysema. Visualized osseous structures are without suspicious lytic or   blastic foci. Prior cataract surgeries. Visualized orbits otherwise unremarkable. Mild ethmoid and right sphenoid sinus mucosal thickening. Visualized paranasal sinuses, middle ear cavities, and mastoid air cells are otherwise clear. No air-fluid levels.      Impression    IMPRESSION:  1. Low-attenuation collection is seen within the right palatine tonsil with surrounding rim enhancement and compatible with an abscess. There is an incomplete enhancing margin along its superomedial aspect suggesting that this may be partially draining.    2. There is extensive inflammatory fat stranding and swelling within the right neck and extending along the right carotid sheath. No extratonsillar collections to suggest extratonsillar abscess. No extension of inflammatory changes into the mediastinum.    Findings discussed with Dr. Duval at 21:03 hours.

## 2023-01-08 ENCOUNTER — HOSPITAL ENCOUNTER (EMERGENCY)
Facility: CLINIC | Age: 83
Discharge: HOME | End: 2023-01-08
Attending: EMERGENCY MEDICINE | Admitting: EMERGENCY MEDICINE
Payer: COMMERCIAL

## 2023-01-08 ENCOUNTER — APPOINTMENT (OUTPATIENT)
Dept: MRI IMAGING | Facility: CLINIC | Age: 83
End: 2023-01-08
Attending: EMERGENCY MEDICINE
Payer: COMMERCIAL

## 2023-01-08 VITALS
OXYGEN SATURATION: 100 % | RESPIRATION RATE: 18 BRPM | SYSTOLIC BLOOD PRESSURE: 148 MMHG | DIASTOLIC BLOOD PRESSURE: 60 MMHG | HEART RATE: 69 BPM | TEMPERATURE: 97.3 F

## 2023-01-08 DIAGNOSIS — M54.42 ACUTE LEFT-SIDED LOW BACK PAIN WITH LEFT-SIDED SCIATICA: ICD-10-CM

## 2023-01-08 DIAGNOSIS — M54.17 LUMBOSACRAL RADICULOPATHY AT L4: ICD-10-CM

## 2023-01-08 PROCEDURE — 99285 EMERGENCY DEPT VISIT HI MDM: CPT | Mod: 25

## 2023-01-08 PROCEDURE — 72158 MRI LUMBAR SPINE W/O & W/DYE: CPT | Mod: MF

## 2023-01-08 PROCEDURE — 255N000002 HC RX 255 OP 636: Performed by: EMERGENCY MEDICINE

## 2023-01-08 PROCEDURE — A9585 GADOBUTROL INJECTION: HCPCS | Performed by: EMERGENCY MEDICINE

## 2023-01-08 RX ORDER — GADOBUTROL 604.72 MG/ML
5 INJECTION INTRAVENOUS ONCE
Status: COMPLETED | OUTPATIENT
Start: 2023-01-08 | End: 2023-01-08

## 2023-01-08 RX ADMIN — GADOBUTROL 5 ML: 604.72 INJECTION INTRAVENOUS at 14:56

## 2023-01-08 ASSESSMENT — ACTIVITIES OF DAILY LIVING (ADL)
ADLS_ACUITY_SCORE: 33
ADLS_ACUITY_SCORE: 33
ADLS_ACUITY_SCORE: 35

## 2023-01-08 ASSESSMENT — ENCOUNTER SYMPTOMS: NUMBNESS: 0

## 2023-01-08 NOTE — ED PROVIDER NOTES
History     Chief Complaint:  Back Pain and Leg Pain       The history is provided by the patient.      Mariluz Gayle is a 82 year old female who presents with back pain. Patient reports she was diagnosed with a compression fracture at L4 in November, which she has begun getting injections for. After her recent injection, she began experiencing lower left back pain, which shoots down her left leg. Denies any urinary/stool changes and numbness.       Independent Historian: yes     Review of External Notes: Chart review     ROS:  Review of Systems   Neurological: Negative for numbness.   All other systems reviewed and are negative.    Allergies:  Amlodipine  Lisinopril  Tramadol     Medications:    Percocet  Hydrocodone     Past Medical History:    Hypertension  Aortic calcification  Osteoporosis  Colon polyp   Bronchitis   Cataract    Past Surgical History:    Eye surgery  Shoulder surgery  Back surgery  Cataract removal (x2)     Family History:    Lung disease    Social History:  PCP: Linda Simon     Physical Exam   Patient Vitals for the past 24 hrs:   BP Temp Temp src Pulse Resp SpO2   01/08/23 1011 (!) 150/72 97.3  F (36.3  C) Temporal 67 19 98 %        Physical Exam  Constitutional: Alert, attentive, GCS 15   Eyes: EOM are normal, anicteric, conjugate gaze  CV: distal extremities warm, well perfused  Chest: Non-labored breathing on RA  GI:  non tender. No distension. No guarding or rebound.    MSK: Diffuse tenderness over left posterior lumbar paraspinal musculature extending into her buttocks, plantarflexion, full in bilateral lower extremities, distal sensation intact.  Neurological: Alert, attentive, moving all extremities equally.   Skin: Skin is warm and dry.        Emergency Department Course     Imaging:  MR Lumbar Spine w/o & w Contrast    (Results Pending)      Report per radiology    Laboratory:  Labs Ordered and Resulted from Time of ED Arrival to Time of ED Departure - No data to display      Emergency Department Course & Assessments:    Interventions:  Medications - No data to display     Independent Interpretation (X-rays, CTs, rhythm strip):  N/A    Consultations/Discussion of Management or Tests:  ED Course as of 23 1142   Sun 2023   1141 I obtained history and examined the patient.      Social Determinants of Health affecting care:  N/A    Disposition:  Care of the patient was transferred to my colleague Dr. Langley pending MRI.     Impression & Plan      CMS Diagnoses: None    Medical Decision Makin-year-old woman past medical history significant for known T12 spinal compression fracture, prior L4-L5 fusion with mild compression of the L4 superior endplate on MRI 2022 for which she underwent cortisone injection 2 weeks prior at outside hospital presenting now for increasing left radicular pain.  She denies fever, she is not on blood thinners but does take naproxen.  On exam she has no overt red flag symptoms with no leg weakness, no saddle anesthesia however given her recent injection and now worsening symptoms, concern is for less likely epidural abscess, potentially epidural hematoma or herniated disc.  Patient did not need additional pain control here in the emergency department, pending MRI, case signed out to Dr. Langley.    Diagnosis:    ICD-10-CM    1. Lumbosacral radiculopathy at L4  M54.17       2. Acute left-sided low back pain with left-sided sciatica  M54.42            Andrew Stacy MD  [employer]  1:14 PM 23     Scribe Disclosure:  I, JOSE JUAN AMEZQUITA, am serving as a scribe at 11:24 AM on 2023 to document services personally performed by Andrew Stacy MD based on my observations and the provider's statements to me.     2023   Andrew Stacy MD Dunbar, John Forrest, MD  [id number]

## 2023-01-08 NOTE — ED TRIAGE NOTES
Hx compression fracture to lower back discovered 12/1.  Back injection in the past two weeks, unsure what was injected. Since then having L hip pain radiating down to L ankle. VSS on RA. Pt reports is painful and tingling.

## 2023-11-22 ENCOUNTER — HOSPITAL ENCOUNTER (EMERGENCY)
Facility: CLINIC | Age: 83
Discharge: HOME | End: 2023-11-23
Attending: EMERGENCY MEDICINE | Admitting: EMERGENCY MEDICINE
Payer: COMMERCIAL

## 2023-11-22 DIAGNOSIS — N30.00 ACUTE CYSTITIS WITHOUT HEMATURIA: ICD-10-CM

## 2023-11-22 DIAGNOSIS — R20.2 LEFT LEG PARESTHESIAS: ICD-10-CM

## 2023-11-22 DIAGNOSIS — Z98.1 S/P LUMBAR FUSION: ICD-10-CM

## 2023-11-22 PROCEDURE — 99285 EMERGENCY DEPT VISIT HI MDM: CPT | Mod: 25

## 2023-11-23 ENCOUNTER — APPOINTMENT (OUTPATIENT)
Dept: MRI IMAGING | Facility: CLINIC | Age: 83
End: 2023-11-23
Attending: EMERGENCY MEDICINE
Payer: COMMERCIAL

## 2023-11-23 VITALS
HEART RATE: 68 BPM | DIASTOLIC BLOOD PRESSURE: 73 MMHG | TEMPERATURE: 97.4 F | SYSTOLIC BLOOD PRESSURE: 168 MMHG | OXYGEN SATURATION: 98 % | RESPIRATION RATE: 16 BRPM

## 2023-11-23 LAB
ALBUMIN UR-MCNC: NEGATIVE MG/DL
ANION GAP SERPL CALCULATED.3IONS-SCNC: 11 MMOL/L (ref 7–15)
APPEARANCE UR: CLEAR
BACTERIA #/AREA URNS HPF: ABNORMAL /HPF
BASOPHILS # BLD AUTO: 0.1 10E3/UL (ref 0–0.2)
BASOPHILS NFR BLD AUTO: 1 %
BILIRUB UR QL STRIP: NEGATIVE
BUN SERPL-MCNC: 30.8 MG/DL (ref 8–23)
CALCIUM SERPL-MCNC: 8.3 MG/DL (ref 8.8–10.2)
CHLORIDE SERPL-SCNC: 100 MMOL/L (ref 98–107)
COLOR UR AUTO: ABNORMAL
CREAT SERPL-MCNC: 1.08 MG/DL (ref 0.51–0.95)
DEPRECATED HCO3 PLAS-SCNC: 26 MMOL/L (ref 22–29)
EGFRCR SERPLBLD CKD-EPI 2021: 50 ML/MIN/1.73M2
EOSINOPHIL # BLD AUTO: 0.7 10E3/UL (ref 0–0.7)
EOSINOPHIL NFR BLD AUTO: 10 %
ERYTHROCYTE [DISTWIDTH] IN BLOOD BY AUTOMATED COUNT: 13.1 % (ref 10–15)
GLUCOSE SERPL-MCNC: 102 MG/DL (ref 70–99)
GLUCOSE UR STRIP-MCNC: NEGATIVE MG/DL
HCT VFR BLD AUTO: 32.1 % (ref 35–47)
HGB BLD-MCNC: 10.1 G/DL (ref 11.7–15.7)
HGB UR QL STRIP: NEGATIVE
IMM GRANULOCYTES # BLD: 0.1 10E3/UL
IMM GRANULOCYTES NFR BLD: 1 %
KETONES UR STRIP-MCNC: NEGATIVE MG/DL
LEUKOCYTE ESTERASE UR QL STRIP: NEGATIVE
LYMPHOCYTES # BLD AUTO: 1.6 10E3/UL (ref 0.8–5.3)
LYMPHOCYTES NFR BLD AUTO: 21 %
MCH RBC QN AUTO: 31.2 PG (ref 26.5–33)
MCHC RBC AUTO-ENTMCNC: 31.5 G/DL (ref 31.5–36.5)
MCV RBC AUTO: 99 FL (ref 78–100)
MONOCYTES # BLD AUTO: 0.6 10E3/UL (ref 0–1.3)
MONOCYTES NFR BLD AUTO: 7 %
NEUTROPHILS # BLD AUTO: 4.5 10E3/UL (ref 1.6–8.3)
NEUTROPHILS NFR BLD AUTO: 60 %
NITRATE UR QL: POSITIVE
NRBC # BLD AUTO: 0 10E3/UL
NRBC BLD AUTO-RTO: 0 /100
PH UR STRIP: 6.5 [PH] (ref 5–7)
PLATELET # BLD AUTO: 300 10E3/UL (ref 150–450)
POTASSIUM SERPL-SCNC: 4.4 MMOL/L (ref 3.4–5.3)
RBC # BLD AUTO: 3.24 10E6/UL (ref 3.8–5.2)
RBC URINE: <1 /HPF
SODIUM SERPL-SCNC: 137 MMOL/L (ref 135–145)
SP GR UR STRIP: 1.01 (ref 1–1.03)
UROBILINOGEN UR STRIP-MCNC: NORMAL MG/DL
WBC # BLD AUTO: 7.5 10E3/UL (ref 4–11)
WBC URINE: 4 /HPF

## 2023-11-23 PROCEDURE — 87086 URINE CULTURE/COLONY COUNT: CPT | Performed by: EMERGENCY MEDICINE

## 2023-11-23 PROCEDURE — 84520 ASSAY OF UREA NITROGEN: CPT | Performed by: EMERGENCY MEDICINE

## 2023-11-23 PROCEDURE — 81001 URINALYSIS AUTO W/SCOPE: CPT | Performed by: EMERGENCY MEDICINE

## 2023-11-23 PROCEDURE — 72158 MRI LUMBAR SPINE W/O & W/DYE: CPT

## 2023-11-23 PROCEDURE — 85025 COMPLETE CBC W/AUTO DIFF WBC: CPT | Performed by: EMERGENCY MEDICINE

## 2023-11-23 PROCEDURE — 36415 COLL VENOUS BLD VENIPUNCTURE: CPT | Performed by: EMERGENCY MEDICINE

## 2023-11-23 PROCEDURE — 255N000002 HC RX 255 OP 636: Performed by: EMERGENCY MEDICINE

## 2023-11-23 PROCEDURE — A9585 GADOBUTROL INJECTION: HCPCS | Performed by: EMERGENCY MEDICINE

## 2023-11-23 PROCEDURE — 250N000011 HC RX IP 250 OP 636: Mod: JZ | Performed by: EMERGENCY MEDICINE

## 2023-11-23 RX ORDER — HYDROMORPHONE HYDROCHLORIDE 2 MG/1
2 TABLET ORAL EVERY 6 HOURS PRN
Qty: 8 TABLET | Refills: 0 | Status: SHIPPED | OUTPATIENT
Start: 2023-11-23 | End: 2023-11-26

## 2023-11-23 RX ORDER — METHOCARBAMOL 500 MG/1
500 TABLET, FILM COATED ORAL 4 TIMES DAILY PRN
Qty: 30 TABLET | Refills: 0 | Status: SHIPPED | OUTPATIENT
Start: 2023-11-23

## 2023-11-23 RX ORDER — CEFTRIAXONE 1 G/1
1 INJECTION, POWDER, FOR SOLUTION INTRAMUSCULAR; INTRAVENOUS ONCE
Qty: 10 ML | Refills: 0 | Status: COMPLETED | OUTPATIENT
Start: 2023-11-23 | End: 2023-11-23

## 2023-11-23 RX ORDER — CEPHALEXIN 500 MG/1
500 CAPSULE ORAL 2 TIMES DAILY
Qty: 14 CAPSULE | Refills: 0 | Status: SHIPPED | OUTPATIENT
Start: 2023-11-23 | End: 2023-11-30

## 2023-11-23 RX ORDER — GADOBUTROL 604.72 MG/ML
5 INJECTION INTRAVENOUS ONCE
Status: COMPLETED | OUTPATIENT
Start: 2023-11-23 | End: 2023-11-23

## 2023-11-23 RX ADMIN — GADOBUTROL 5 ML: 604.72 INJECTION INTRAVENOUS at 00:28

## 2023-11-23 RX ADMIN — CEFTRIAXONE 1 G: 1 INJECTION, POWDER, FOR SOLUTION INTRAMUSCULAR; INTRAVENOUS at 01:10

## 2023-11-23 ASSESSMENT — ACTIVITIES OF DAILY LIVING (ADL): ADLS_ACUITY_SCORE: 35

## 2023-11-23 NOTE — ED TRIAGE NOTES
Numbness and tingling in LLE that started 3-4 days ago. Post op 2 weeks fusion of L3-L4. Patient also complains of rash surrounding her surgical site.

## 2023-11-23 NOTE — ED NOTES
Pt concurs with triage note - she had her surgery 2 weeks ago at [facility]. She  adds 12 years ago she had a similar surgery, just different location, and she didn't have this issue. Pt states she was taking Oxy for her pain, but rash was noted, so she was switched to Dilaudid - it helps her pain but she notes a rash - worse on her back, but noted on chest and right arm. Daughter states they haven't put any salve/creams on it. Pt has a strong foot press and is able to walk, but c/o numbness/tingling on the entire left lower extremity. Pt is alert, orient and appropriate. Her skin is warm and dry. Her daughter is by her side.

## 2023-11-23 NOTE — ED PROVIDER NOTES
"  History     Chief Complaint:  Numbness and Rash       HPI   Mariluz Gayle is a 83 year old female s/p L3-L4 fusion at [facility] by Dr. Conway on 11/8 who presents with LLE numbness and tingling and a pruritic rash around her surgical site that began 3-4 days ago. Per patient, LLE tingling/numbness had been chronically present prior to the surgery but improved until about 2-3 days post surgery.  She has tried using alcohol swabs and cold water around the rash in attempt to relief the itching, but did not have relief. No drainage from the surgical site. She has had chronic numbness to her lower back and LLE, which she states has worsened after surgery. She states her left leg feels \"tingling and cold.\" She took naproxen without improvement today. She is also prescribed Dilaudid as she had allergic reaction to Percocet with rash. She denies fevers, loss of bowel or bladder control, saddle anesthesia, abdominal pain.  She took AZO a couple of days ago for dysuria but this has improved.        Independent Historian:   Patient and daughter provide history collaboratively.    Review of External Notes:   11/8 surgical note from Gato Conway MD from [facility]    Medications:    Percocet  Hydrocodone      Past Medical History:    Hypertension  Aortic calcification  Osteoporosis  Colon polyp   Bronchitis   Cataract     Past Surgical History:    Eye surgery  Shoulder surgery  Back surgery  Cataract removal (x2)   L3-L4 Fusion    Physical Exam   Patient Vitals for the past 24 hrs:   BP Temp Temp src Pulse Resp SpO2   11/23/23 0213 (!) 168/73 -- -- 68 16 98 %   11/22/23 2313 (!) 177/69 97.4  F (36.3  C) Temporal 76 16 99 %        Physical Exam  General: the patient is awake and interactive  HEENT:  Moist mucous membranes, conjunctiva normal  Pulmonary:  Normal respiratory effort  Cardiovascular:  Well perfused  Abdomen: Soft, nontender, nondistended. No guarding or rebound.  Musculoskeletal:  Moving 4 " extremities grossly wnl, no deformities; bilateral lumbar surgical incisions appear well healed; mild sandpapery rash to the lower back with no significant erythema or warmth.  4/5 BLE hip flexion/extension; 5/5 ankle dorsiflexion/plantarflexion.  SILT to BLE. DP/PT pulses 2+ bilaterally.   Neuro:  Speech normal, no focal deficits  Psych:  Normal affect     Emergency Department Course     Imaging:  Lumbar spine MRI w & w/o contrast - surgery <10yrs   Final Result   IMPRESSION:   1.  Interval posterior instrumented fusion and interbody fusion at L3-L4. Susceptibility artifact obscures the central canal and adjacent structures. There is some effacement of the thecal sac, but there does not appear to be high-grade stenosis. The    foramina are obscured.   2.  Interbody fusion at L4-L5 with posterior instrumented fusion. No central canal stenosis at this level. Moderate bilateral neural foraminal narrowing.   3.  Relatively minor multilevel degenerative changes elsewhere as described.           Laboratory:  Labs Ordered and Resulted from Time of ED Arrival to Time of ED Departure   BASIC METABOLIC PANEL - Abnormal       Result Value    Sodium 137      Potassium 4.4      Chloride 100      Carbon Dioxide (CO2) 26      Anion Gap 11      Urea Nitrogen 30.8 (*)     Creatinine 1.08 (*)     GFR Estimate 50 (*)     Calcium 8.3 (*)     Glucose 102 (*)    ROUTINE UA WITH MICROSCOPIC REFLEX TO CULTURE - Abnormal    Color Urine Dark Yellow (*)     Appearance Urine Clear      Glucose Urine Negative      Bilirubin Urine Negative      Ketones Urine Negative      Specific Gravity Urine 1.008      Blood Urine Negative      pH Urine 6.5      Protein Albumin Urine Negative      Urobilinogen Urine Normal      Nitrite Urine Positive (*)     Leukocyte Esterase Urine Negative      Bacteria Urine Few (*)     RBC Urine <1      WBC Urine 4     CBC WITH PLATELETS AND DIFFERENTIAL - Abnormal    WBC Count 7.5      RBC Count 3.24 (*)     Hemoglobin  10.1 (*)     Hematocrit 32.1 (*)     MCV 99      MCH 31.2      MCHC 31.5      RDW 13.1      Platelet Count 300      % Neutrophils 60      % Lymphocytes 21      % Monocytes 7      % Eosinophils 10      % Basophils 1      % Immature Granulocytes 1      NRBCs per 100 WBC 0      Absolute Neutrophils 4.5      Absolute Lymphocytes 1.6      Absolute Monocytes 0.6      Absolute Eosinophils 0.7      Absolute Basophils 0.1      Absolute Immature Granulocytes 0.1      Absolute NRBCs 0.0     URINE CULTURE          Emergency Department Course & Assessments:    Interventions:  Medications   gadobutrol (GADAVIST) injection 5 mL (5 mLs Intravenous $Given 23 0028)   cefTRIAXone (ROCEPHIN) 1 g vial to attach to  mL bag for ADULTS or NS 50 mL bag for PEDS (0 g Intravenous Stopped 23 0142)        Assessments:   I obtained history and examined the patient, as noted above.  0157 I rechecked and updated the patient.    Independent Interpretation (X-rays, CTs, rhythm strip):  None    Consultations/Discussion of Management or Tests:  None        Social Determinants of Health affecting care:   None    Disposition:  The patient was discharged to home.     Impression & Plan      Medical Decision Makin-year-old female status post L3-L4 lumbar fusion  at [facility] presenting to the ER for evaluation of pruritic rash around the surgical site as well as paresthesias to the left lower extremity.  She reports chronically having this paresthesia prior to her surgery but improved postsurgery until a few days after the surgery.  She otherwise denies any weakness, saddle anesthesia, bowel or bladder incontinence. There is mild sandpapery rash to the low back without any significant warmth or erythema; there is no signs of overlying skin infection/cellulitis or necrotizing infection or skin abscess.  Surgical scars appear well-healed without evidence of drainage or bleeding.  Recommend Benadryl as needed  for itching.  Lab studies are largely unremarkable with normal CBC.  UA was positive for nitrates with 4 WBCs and with patient's recent dysuria, we will treat for acute cystitis.  Overall signs/symptoms or not concerning for pyelonephritis or kidney stone or intra-abdominal catastrophe requiring CT imaging.  We did obtain MRI imaging given her recent procedure and radicular symptoms.  No acute worrisome findings noted; no signs of cauda equina or spinal cord compression.  Patient has good extremity pulses, no signs concerning for arterial insufficiency or limb ischemia.  Patient is overall well-appearing and is able to ambulate without difficulty.  With reasonable certainty I do feel that she is safe to discharge home to follow-up with her spinal surgeon regarding her recurrence of left leg paresthesias.  Patient is out of her pain medication and muscle relaxant and short course will be refilled in the emergency department but recommend following up with her surgeon for additional refills.  Patient will be sent home with oral antibiotics.  Discussed return precautions for the emergency department.  Patient and daughter are comfortable with the overall plan.  All questions were answered prior to discharge      Diagnosis:    ICD-10-CM    1. Left leg paresthesias  R20.2       2. S/P lumbar fusion  Z98.1       3. Acute cystitis without hematuria  N30.00            Discharge Medications:  Discharge Medication List as of 11/23/2023  2:09 AM        START taking these medications    Details   cephALEXin (KEFLEX) 500 MG capsule Take 1 capsule (500 mg) by mouth 2 times daily for 7 days, Disp-14 capsule, R-0, Local Print      HYDROmorphone (DILAUDID) 2 MG tablet Take 1 tablet (2 mg) by mouth every 6 hours as needed for moderate to severe pain, Disp-8 tablet, R-0, Local Print      methocarbamol (ROBAXIN) 500 MG tablet Take 1 tablet (500 mg) by mouth 4 times daily as needed for muscle spasms, Disp-30 tablet, R-0, Local Print               Scribe Disclosure:  I, Dannie Martínez, am serving as a scribe at 11:34 PM on 11/22/2023 to document services personally performed by Mendez Kendrick MD based on my observations and the provider's statements to me.        Mendez Kendrick MD  11/23/23 0848

## 2023-11-24 LAB — BACTERIA UR CULT: NORMAL

## 2024-05-15 ENCOUNTER — APPOINTMENT (OUTPATIENT)
Dept: CT IMAGING | Facility: CLINIC | Age: 84
End: 2024-05-15
Attending: EMERGENCY MEDICINE
Payer: COMMERCIAL

## 2024-05-15 ENCOUNTER — HOSPITAL ENCOUNTER (EMERGENCY)
Facility: CLINIC | Age: 84
Discharge: HOME | End: 2024-05-15
Attending: EMERGENCY MEDICINE | Admitting: EMERGENCY MEDICINE
Payer: COMMERCIAL

## 2024-05-15 VITALS
HEIGHT: 57 IN | DIASTOLIC BLOOD PRESSURE: 69 MMHG | HEART RATE: 82 BPM | BODY MASS INDEX: 21.57 KG/M2 | RESPIRATION RATE: 20 BRPM | OXYGEN SATURATION: 99 % | SYSTOLIC BLOOD PRESSURE: 164 MMHG | TEMPERATURE: 97 F | WEIGHT: 100 LBS

## 2024-05-15 DIAGNOSIS — R79.89 ELEVATED TROPONIN: ICD-10-CM

## 2024-05-15 DIAGNOSIS — R91.1 PULMONARY NODULE: ICD-10-CM

## 2024-05-15 DIAGNOSIS — M54.9 BACK PAIN, UNSPECIFIED BACK LOCATION, UNSPECIFIED BACK PAIN LATERALITY, UNSPECIFIED CHRONICITY: ICD-10-CM

## 2024-05-15 LAB
ABO/RH(D): NORMAL
ALBUMIN SERPL BCG-MCNC: 4.5 G/DL (ref 3.5–5.2)
ALBUMIN UR-MCNC: 10 MG/DL
ALP SERPL-CCNC: 65 U/L (ref 40–150)
ALT SERPL W P-5'-P-CCNC: 8 U/L (ref 0–50)
ANION GAP SERPL CALCULATED.3IONS-SCNC: 12 MMOL/L (ref 7–15)
ANTIBODY SCREEN: NEGATIVE
APPEARANCE UR: CLEAR
APTT PPP: 41 SECONDS (ref 22–38)
AST SERPL W P-5'-P-CCNC: 14 U/L (ref 0–45)
ATRIAL RATE - MUSE: 72 BPM
BACTERIA #/AREA URNS HPF: ABNORMAL /HPF
BASOPHILS # BLD AUTO: 0 10E3/UL (ref 0–0.2)
BASOPHILS NFR BLD AUTO: 1 %
BILIRUB SERPL-MCNC: 0.4 MG/DL
BILIRUB UR QL STRIP: NEGATIVE
BUN SERPL-MCNC: 35.1 MG/DL (ref 8–23)
CALCIUM SERPL-MCNC: 9.3 MG/DL (ref 8.8–10.2)
CHLORIDE SERPL-SCNC: 103 MMOL/L (ref 98–107)
COLOR UR AUTO: ABNORMAL
CREAT SERPL-MCNC: 1.09 MG/DL (ref 0.51–0.95)
DEPRECATED HCO3 PLAS-SCNC: 25 MMOL/L (ref 22–29)
DIASTOLIC BLOOD PRESSURE - MUSE: NORMAL MMHG
EGFRCR SERPLBLD CKD-EPI 2021: 49 ML/MIN/1.73M2
EOSINOPHIL # BLD AUTO: 0.2 10E3/UL (ref 0–0.7)
EOSINOPHIL NFR BLD AUTO: 3 %
ERYTHROCYTE [DISTWIDTH] IN BLOOD BY AUTOMATED COUNT: 12.9 % (ref 10–15)
GLUCOSE SERPL-MCNC: 115 MG/DL (ref 70–99)
GLUCOSE UR STRIP-MCNC: NEGATIVE MG/DL
HCT VFR BLD AUTO: 38.5 % (ref 35–47)
HGB BLD-MCNC: 12.6 G/DL (ref 11.7–15.7)
HGB UR QL STRIP: NEGATIVE
HOLD SPECIMEN: NORMAL
IMM GRANULOCYTES # BLD: 0 10E3/UL
IMM GRANULOCYTES NFR BLD: 1 %
INR PPP: 0.99 (ref 0.85–1.15)
INTERPRETATION ECG - MUSE: NORMAL
KETONES UR STRIP-MCNC: NEGATIVE MG/DL
LEUKOCYTE ESTERASE UR QL STRIP: ABNORMAL
LIPASE SERPL-CCNC: 37 U/L (ref 13–60)
LYMPHOCYTES # BLD AUTO: 0.9 10E3/UL (ref 0.8–5.3)
LYMPHOCYTES NFR BLD AUTO: 10 %
MAGNESIUM SERPL-MCNC: 2.1 MG/DL (ref 1.7–2.3)
MCH RBC QN AUTO: 31.5 PG (ref 26.5–33)
MCHC RBC AUTO-ENTMCNC: 32.7 G/DL (ref 31.5–36.5)
MCV RBC AUTO: 96 FL (ref 78–100)
MONOCYTES # BLD AUTO: 0.4 10E3/UL (ref 0–1.3)
MONOCYTES NFR BLD AUTO: 5 %
MUCOUS THREADS #/AREA URNS LPF: PRESENT /LPF
NEUTROPHILS # BLD AUTO: 6.7 10E3/UL (ref 1.6–8.3)
NEUTROPHILS NFR BLD AUTO: 80 %
NITRATE UR QL: NEGATIVE
NRBC # BLD AUTO: 0 10E3/UL
NRBC BLD AUTO-RTO: 0 /100
NT-PROBNP SERPL-MCNC: 554 PG/ML (ref 0–1800)
P AXIS - MUSE: 63 DEGREES
PH UR STRIP: 6.5 [PH] (ref 5–7)
PLATELET # BLD AUTO: 220 10E3/UL (ref 150–450)
POTASSIUM SERPL-SCNC: 4.7 MMOL/L (ref 3.4–5.3)
PR INTERVAL - MUSE: 148 MS
PROT SERPL-MCNC: 7.2 G/DL (ref 6.4–8.3)
QRS DURATION - MUSE: 84 MS
QT - MUSE: 412 MS
QTC - MUSE: 451 MS
R AXIS - MUSE: 16 DEGREES
RBC # BLD AUTO: 4 10E6/UL (ref 3.8–5.2)
RBC URINE: 3 /HPF
SODIUM SERPL-SCNC: 140 MMOL/L (ref 135–145)
SP GR UR STRIP: 1.02 (ref 1–1.03)
SPECIMEN EXPIRATION DATE: NORMAL
SQUAMOUS EPITHELIAL: 1 /HPF
SYSTOLIC BLOOD PRESSURE - MUSE: NORMAL MMHG
T AXIS - MUSE: 45 DEGREES
TROPONIN T SERPL HS-MCNC: 43 NG/L
TROPONIN T SERPL HS-MCNC: 49 NG/L
UROBILINOGEN UR STRIP-MCNC: NORMAL MG/DL
VENTRICULAR RATE- MUSE: 72 BPM
WBC # BLD AUTO: 8.3 10E3/UL (ref 4–11)
WBC URINE: 3 /HPF

## 2024-05-15 PROCEDURE — 85025 COMPLETE CBC W/AUTO DIFF WBC: CPT | Performed by: EMERGENCY MEDICINE

## 2024-05-15 PROCEDURE — 86900 BLOOD TYPING SEROLOGIC ABO: CPT | Performed by: EMERGENCY MEDICINE

## 2024-05-15 PROCEDURE — 250N000013 HC RX MED GY IP 250 OP 250 PS 637: Performed by: EMERGENCY MEDICINE

## 2024-05-15 PROCEDURE — 36415 COLL VENOUS BLD VENIPUNCTURE: CPT | Performed by: EMERGENCY MEDICINE

## 2024-05-15 PROCEDURE — 83690 ASSAY OF LIPASE: CPT | Performed by: EMERGENCY MEDICINE

## 2024-05-15 PROCEDURE — 93005 ELECTROCARDIOGRAM TRACING: CPT

## 2024-05-15 PROCEDURE — 81001 URINALYSIS AUTO W/SCOPE: CPT | Performed by: EMERGENCY MEDICINE

## 2024-05-15 PROCEDURE — 250N000011 HC RX IP 250 OP 636: Performed by: EMERGENCY MEDICINE

## 2024-05-15 PROCEDURE — 83880 ASSAY OF NATRIURETIC PEPTIDE: CPT | Performed by: EMERGENCY MEDICINE

## 2024-05-15 PROCEDURE — 84484 ASSAY OF TROPONIN QUANT: CPT | Mod: 91 | Performed by: EMERGENCY MEDICINE

## 2024-05-15 PROCEDURE — 82040 ASSAY OF SERUM ALBUMIN: CPT | Performed by: EMERGENCY MEDICINE

## 2024-05-15 PROCEDURE — 71260 CT THORAX DX C+: CPT

## 2024-05-15 PROCEDURE — 99285 EMERGENCY DEPT VISIT HI MDM: CPT | Mod: 25

## 2024-05-15 PROCEDURE — 87086 URINE CULTURE/COLONY COUNT: CPT | Performed by: EMERGENCY MEDICINE

## 2024-05-15 PROCEDURE — 250N000009 HC RX 250: Performed by: EMERGENCY MEDICINE

## 2024-05-15 PROCEDURE — 85730 THROMBOPLASTIN TIME PARTIAL: CPT | Performed by: EMERGENCY MEDICINE

## 2024-05-15 PROCEDURE — 85610 PROTHROMBIN TIME: CPT | Performed by: EMERGENCY MEDICINE

## 2024-05-15 PROCEDURE — 83735 ASSAY OF MAGNESIUM: CPT | Performed by: EMERGENCY MEDICINE

## 2024-05-15 RX ORDER — METHOCARBAMOL 750 MG/1
750 TABLET, FILM COATED ORAL ONCE
Status: COMPLETED | OUTPATIENT
Start: 2024-05-15 | End: 2024-05-15

## 2024-05-15 RX ORDER — IOPAMIDOL 755 MG/ML
72 INJECTION, SOLUTION INTRAVASCULAR ONCE
Status: COMPLETED | OUTPATIENT
Start: 2024-05-15 | End: 2024-05-15

## 2024-05-15 RX ORDER — MAGNESIUM HYDROXIDE/ALUMINUM HYDROXICE/SIMETHICONE 120; 1200; 1200 MG/30ML; MG/30ML; MG/30ML
15 SUSPENSION ORAL ONCE
Status: COMPLETED | OUTPATIENT
Start: 2024-05-15 | End: 2024-05-15

## 2024-05-15 RX ADMIN — ALUMINUM HYDROXIDE, MAGNESIUM HYDROXIDE, AND SIMETHICONE 15 ML: 1200; 120; 1200 SUSPENSION ORAL at 17:54

## 2024-05-15 RX ADMIN — METHOCARBAMOL 750 MG: 750 TABLET ORAL at 18:19

## 2024-05-15 RX ADMIN — IOPAMIDOL 72 ML: 755 INJECTION, SOLUTION INTRAVENOUS at 17:07

## 2024-05-15 RX ADMIN — SODIUM CHLORIDE 60 ML: 9 INJECTION, SOLUTION INTRAVENOUS at 17:08

## 2024-05-15 ASSESSMENT — ACTIVITIES OF DAILY LIVING (ADL)
ADLS_ACUITY_SCORE: 36
ADLS_ACUITY_SCORE: 34
ADLS_ACUITY_SCORE: 36
ADLS_ACUITY_SCORE: 36

## 2024-05-15 ASSESSMENT — COLUMBIA-SUICIDE SEVERITY RATING SCALE - C-SSRS
2. HAVE YOU ACTUALLY HAD ANY THOUGHTS OF KILLING YOURSELF IN THE PAST MONTH?: NO
1. IN THE PAST MONTH, HAVE YOU WISHED YOU WERE DEAD OR WISHED YOU COULD GO TO SLEEP AND NOT WAKE UP?: NO
6. HAVE YOU EVER DONE ANYTHING, STARTED TO DO ANYTHING, OR PREPARED TO DO ANYTHING TO END YOUR LIFE?: NO

## 2024-05-15 NOTE — ED PROVIDER NOTES
"  Emergency Department Note      History of Present Illness     Chief Complaint  Back Pain    HPI  Mariluz Gayle is a 84 year old female with a history of hypertension, aortic calcification, CKD, and osteoporosis who presents for evaluation of back pain. She states she was laying on her couch about 2 hours ago and after a couple of minutes, she got up and had sudden onset of upper back pain. She states the pain has radiated into her shoulders and upper abdomen. The pain is intermittent and she has shortness of breath occasionally. Notes that she used an ice pack and took hydrocodone about an hour ago without relief. She has a history of lumbar spine surgery. States the pain she is experiencing now is not to the lumbar spine. She denies numbness, tingling, nausea, and headache.    Independent Historian  None    Review of External Notes  Reviewed prior OR note for lumbar fusion in 11/23, and ER visit 11/22/23 for lumbar pain  Past Medical History   Medical History and Problem List  Abscess  Aortic calcification  Hypertension  Osteoporosis   Fibrocystic breast disease  Tubular adenoma  CKD    Medications  Hydrochlorothiazide   Naproxen   Methocarbamol     Surgical History   Rotator cuff repair  Back surgery  Blepharoplasty  Cataract  Decompression, PSF, and TILF an L3-4      Physical Exam   Patient Vitals for the past 24 hrs:   BP Temp Temp src Pulse Resp SpO2 Height Weight   05/15/24 1936 -- -- -- -- -- -- 1.448 m (4' 9\") 45.4 kg (100 lb)   05/15/24 1933 (!) 164/69 -- -- 82 -- 99 % -- --   05/15/24 1800 (!) 165/73 -- -- 65 -- 98 % -- --   05/15/24 1739 (!) 157/91 -- -- -- -- 97 % -- --   05/15/24 1730 (!) 167/66 -- -- 68 -- 100 % -- --   05/15/24 1419 (!) 193/80 97  F (36.1  C) Temporal 75 20 100 % -- --   05/15/24 1411 (!) 193/80 97  F (36.1  C) -- 75 20 100 % -- --     Physical Exam  Vitals reviewed.   Constitutional:       General: She is not in acute distress.     Appearance: She is not ill-appearing.   HENT:      " Head: Normocephalic and atraumatic.   Eyes:      Extraocular Movements: Extraocular movements intact.   Cardiovascular:      Rate and Rhythm: Normal rate and regular rhythm.      Pulses:           Radial pulses are 2+ on the right side and 2+ on the left side.   Pulmonary:      Effort: Pulmonary effort is normal. No respiratory distress.      Breath sounds: Normal breath sounds. No wheezing.   Abdominal:      Palpations: Abdomen is soft.      Tenderness: There is no abdominal tenderness. There is no guarding.   Musculoskeletal:      Cervical back: Normal range of motion. No tenderness or bony tenderness.      Thoracic back: No tenderness or bony tenderness.      Lumbar back: No tenderness or bony tenderness.      Comments: Pt able to ambulate without any problem   Skin:     General: Skin is warm and dry.   Neurological:      Mental Status: She is alert and oriented to person, place, and time.      GCS: GCS eye subscore is 4. GCS verbal subscore is 5. GCS motor subscore is 6.   Psychiatric:         Behavior: Behavior normal.       Diagnostics   Lab Results   Labs Ordered and Resulted from Time of ED Arrival to Time of ED Departure   PARTIAL THROMBOPLASTIN TIME - Abnormal       Result Value    aPTT 41 (*)    COMPREHENSIVE METABOLIC PANEL - Abnormal    Sodium 140      Potassium 4.7      Carbon Dioxide (CO2) 25      Anion Gap 12      Urea Nitrogen 35.1 (*)     Creatinine 1.09 (*)     GFR Estimate 49 (*)     Calcium 9.3      Chloride 103      Glucose 115 (*)     Alkaline Phosphatase 65      AST 14      ALT 8      Protein Total 7.2      Albumin 4.5      Bilirubin Total 0.4     ROUTINE UA WITH MICROSCOPIC REFLEX TO CULTURE - Abnormal    Color Urine Light Yellow      Appearance Urine Clear      Glucose Urine Negative      Bilirubin Urine Negative      Ketones Urine Negative      Specific Gravity Urine 1.021      Blood Urine Negative      pH Urine 6.5      Protein Albumin Urine 10 (*)     Urobilinogen Urine Normal       Nitrite Urine Negative      Leukocyte Esterase Urine Large (*)     Bacteria Urine Few (*)     Mucus Urine Present (*)     RBC Urine 3 (*)     WBC Urine 3      Squamous Epithelials Urine 1     TROPONIN T, HIGH SENSITIVITY - Abnormal    Troponin T, High Sensitivity 43 (*)    TROPONIN T, HIGH SENSITIVITY - Abnormal    Troponin T, High Sensitivity 49 (*)    INR - Normal    INR 0.99     MAGNESIUM - Normal    Magnesium 2.1     NT PROBNP INPATIENT - Normal    N terminal Pro BNP Inpatient 554     LIPASE - Normal    Lipase 37     CBC WITH PLATELETS AND DIFFERENTIAL    WBC Count 8.3      RBC Count 4.00      Hemoglobin 12.6      Hematocrit 38.5      MCV 96      MCH 31.5      MCHC 32.7      RDW 12.9      Platelet Count 220      % Neutrophils 80      % Lymphocytes 10      % Monocytes 5      % Eosinophils 3      % Basophils 1      % Immature Granulocytes 1      NRBCs per 100 WBC 0      Absolute Neutrophils 6.7      Absolute Lymphocytes 0.9      Absolute Monocytes 0.4      Absolute Eosinophils 0.2      Absolute Basophils 0.0      Absolute Immature Granulocytes 0.0      Absolute NRBCs 0.0     TYPE AND SCREEN, ADULT    ABO/RH(D) O POS      Antibody Screen Negative      SPECIMEN EXPIRATION DATE [id number]     URINE CULTURE   ABO/RH TYPE AND SCREEN       Imaging  CT Aortic Survey w Contrast   Final Result   IMPRESSION:   1.  No acute aortic abnormality. No additional visualized explanation for patient's symptoms.   2.  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 Distal branches are patent.   3.  Incidental 3 mm left upper lobe pulmonary nodule, consider follow-up described below.      REFERENCE:   Guidelines for Management of Incidental Pulmonary Nodules Detected on CT Images: From the Fleischner Society 2017.    Guidelines apply to incidental nodules in patients who are 35 years or older.   Guidelines do not apply to lung cancer screening,  patients with immunosuppression, or patients with known primary cancer.      SINGLE NODULE   Nodule size <6 mm   Low-risk patients: No follow-up needed.   High-risk patients: Optional follow-up at 12 months.                    EKG   ECG taken at 1412, ECG read at 1415  Normal sinus rhythm   Low voltage QRS   Rate 72 bpm. OK interval 148 ms. QRS duration 84 ms. QT/QTc 412/451 ms. P-R-T axes 63 16 45.    Independent Interpretation  None  ED Course    Medications Administered  Medications   iopamidol (ISOVUE-370) solution 72 mL (72 mLs Intravenous $Given 5/15/24 1707)   sodium chloride 0.9 % bag 500mL for CT scan flush use (60 mLs Intravenous $Given 5/15/24 1708)   alum & mag hydroxide-simethicone (MAALOX) suspension 15 mL (15 mLs Oral $Given 5/15/24 1754)   methocarbamol (ROBAXIN) tablet 750 mg (750 mg Oral $Given 5/15/24 1819)     Procedures  Procedures     Discussion of Management  None    Social Determinants of Health adding to complexity of care  None    ED Course  ED Course as of 05/15/24 1950   Wed May 15, 2024   1559 I obtained history and examined the patient as noted above.    1748 I reviewed CT which shows aortic calcifications.   1811 I rechecked and updated the patient.    1911 I rechecked and updated the patient.    1921 Reassessed the patient she was updated on results.  Discussed that the troponin did uptrend by 6 points.  She states she did not have any chest pain.  States that her symptoms of her mid back pain have resolved.  States that she has pain to the left leg which she has had chronically with sciatic pain.  She is status post a lumbar fusion done in November 2023.  I discussed the plan for discharge.  Discussed findings seen on CT aortic survey.  They are comfortable with discharge home.     Medical Decision Making / Diagnosis   CMS Diagnoses: None    MIPS     None    MDM  Mariluz Gayle is a 84 year old female who presents today with upper back pain.  She states that she was laying on the couch  and developed sudden upper back pain.  She has history of a lumbar fusion denies any lower back pain.  Denies any numbness or tingling to the upper extremities.  States that the pain radiated throughout her arms and upper abdomen.  States that she took part of a hydrocodone without any improvement.  She was initially seen and evaluated in triage noted to be hypertensive.  She has no point tenderness to the cervical, thoracic, lumbar spine.  She is moving bilateral upper and lower extremities without any difficulty.  Distal pulses are intact.  Given the back pain and chest pain CT aortic study was ordered as well as cardiac workup.  Her initial troponin was found to slightly elevated.  CT aortic study showed no evidence of dissection.  Lipase was normal.  LFTs within normal limits.  Patient was reevaluated states that she had no pain.  I discussed her imaging study results.  Discussed plan for repeat troponin.  Her repeat troponin up trended by 6.  She denies any chest pain.  I discussed these findings with the patient regarding the uptrending troponin.  She states that she does not have any chest pain.  I discussed with her at this time plan for discharge.  She states she has no back pain, upper abdominal pain at this time.  Daughter at the bedside at this time.  Discussed plan for discharge and close follow-up with primary care doctor.  Discussed strict return precautions if she develops any chest pain or shortness of breath or any concerning symptoms.  They verbalized understanding and agreement.    Disposition  The patient was discharged.     ICD-10 Codes:    ICD-10-CM    1. Back pain, unspecified back location, unspecified back pain laterality, unspecified chronicity  M54.9       2. Elevated troponin  R79.89       3. Pulmonary nodule  R91.1              Scribe Disclosure:  Tricia JUAREZ, am serving as a scribe at 4:32 PM on 5/15/2024 to document services personally performed by Winyd Calloway DO based on my  observations and the provider's statements to me.     Scribe Disclosure:  I, Aziza Chilel, am serving as a scribe  at 7:21 PM on 5/15/2024 to document services personally performed by Windy Calloway DO based on my observations and the provider's statements to me.         Windy Calloway DO  05/17/24 0929

## 2024-05-15 NOTE — ED TRIAGE NOTES
Pt states she laid on the couch for a minute or 2 then got up and had sudden pain between shoulder blades that went into chest and abdomen. Pt states the pain is 9/10 and c/o difficulty walking now.    Triage Assessment (Adult)       Row Name 05/15/24 1416          Triage Assessment    Airway WDL WDL        Respiratory WDL    Respiratory WDL WDL        Skin Circulation/Temperature WDL    Skin Circulation/Temperature WDL WDL        Cardiac WDL    Cardiac WDL chest pain        Chest Pain Assessment    Chest Pain Location epigastric     Chest Pain Radiation shoulder;back;abdomen     Character burning

## 2024-05-17 LAB — BACTERIA UR CULT: NORMAL
